# Patient Record
Sex: FEMALE
[De-identification: names, ages, dates, MRNs, and addresses within clinical notes are randomized per-mention and may not be internally consistent; named-entity substitution may affect disease eponyms.]

---

## 2023-12-05 ENCOUNTER — DASHBOARD ENCOUNTERS (OUTPATIENT)
Age: 48
End: 2023-12-05

## 2023-12-05 ENCOUNTER — OFFICE VISIT (OUTPATIENT)
Dept: URBAN - METROPOLITAN AREA CLINIC 98 | Facility: CLINIC | Age: 48
End: 2023-12-05
Payer: COMMERCIAL

## 2023-12-05 ENCOUNTER — TELEPHONE ENCOUNTER (OUTPATIENT)
Dept: URBAN - METROPOLITAN AREA CLINIC 98 | Facility: CLINIC | Age: 48
End: 2023-12-05

## 2023-12-05 ENCOUNTER — LAB OUTSIDE AN ENCOUNTER (OUTPATIENT)
Dept: URBAN - METROPOLITAN AREA CLINIC 98 | Facility: CLINIC | Age: 48
End: 2023-12-05

## 2023-12-05 VITALS
WEIGHT: 121 LBS | DIASTOLIC BLOOD PRESSURE: 97 MMHG | TEMPERATURE: 97.1 F | HEART RATE: 87 BPM | HEIGHT: 65 IN | SYSTOLIC BLOOD PRESSURE: 159 MMHG | BODY MASS INDEX: 20.16 KG/M2

## 2023-12-05 DIAGNOSIS — K62.5 RECTAL BLEEDING: ICD-10-CM

## 2023-12-05 DIAGNOSIS — K64.9 BLEEDING HEMORRHOIDS: ICD-10-CM

## 2023-12-05 PROCEDURE — 99204 OFFICE O/P NEW MOD 45 MIN: CPT

## 2023-12-05 RX ORDER — POLYETHYLENE GLYCOL 3350, SODIUM CHLORIDE, SODIUM BICARBONATE, POTASSIUM CHLORIDE 420; 11.2; 5.72; 1.48 G/4L; G/4L; G/4L; G/4L
4000 ML POWDER, FOR SOLUTION ORAL AS DIRECTED
Qty: 4000 ML | Refills: 0 | OUTPATIENT
Start: 2023-12-05 | End: 2023-12-06

## 2023-12-05 NOTE — PHYSICAL EXAM GASTROINTESTINAL
Abdomen, soft, nontender, nondistended, no guarding or rigidity, no masses palpable, normal bowel sounds, Liver and Spleen,  no hepatosplenomegaly, liver nontender RECTAL EXAM: INFLAMED EXTERNAL Hemorrhoids NOTED. MINIMAL BLOOD NOTED DURING EXAM

## 2023-12-05 NOTE — HPI-TODAY'S VISIT:
Ms. Hill is a 48-year-old female new patient here with rectal bleeding and constipation. PCP unknown. - here with spouse Dariel -  tom paz #[id number] [place] - rectal bleeding on and off for several years - last 10 days constant bleeding toilet tissue and in toilet - bright red blood with pain and itching - BM every 2-3 days - Stools are round hard - No previous colonoscopy - Denies family history of colon cancer/polyps - no unintentional weight loss - normal appetite - no melena or mucus - using topical medication from pcp - minimal relief

## 2023-12-07 ENCOUNTER — CLAIMS CREATED FROM THE CLAIM WINDOW (OUTPATIENT)
Dept: URBAN - METROPOLITAN AREA CLINIC 4 | Facility: CLINIC | Age: 48
End: 2023-12-07
Payer: COMMERCIAL

## 2023-12-07 ENCOUNTER — OUT OF OFFICE VISIT (OUTPATIENT)
Dept: URBAN - METROPOLITAN AREA SURGERY CENTER 18 | Facility: SURGERY CENTER | Age: 48
End: 2023-12-07
Payer: COMMERCIAL

## 2023-12-07 DIAGNOSIS — Z12.11 COLON CANCER SCREENING: ICD-10-CM

## 2023-12-07 DIAGNOSIS — D12.5 ADENOMATOUS POLYP OF SIGMOID COLON: ICD-10-CM

## 2023-12-07 DIAGNOSIS — Z12.11 COLON CANCER SCREENING (HIGH RISK): ICD-10-CM

## 2023-12-07 DIAGNOSIS — D12.5 ADENOMA OF SIGMOID COLON: ICD-10-CM

## 2023-12-07 DIAGNOSIS — K64.4 INTERNAL AND EXTERNAL HEMORRHOIDS WITHOUT COMPLICATION: ICD-10-CM

## 2023-12-07 DIAGNOSIS — D12.5 BENIGN NEOPLASM OF SIGMOID COLON: ICD-10-CM

## 2023-12-07 LAB
A/G RATIO: 1.4
ABSOLUTE BASOPHILS: 58
ABSOLUTE EOSINOPHILS: 109
ABSOLUTE LYMPHOCYTES: 1434
ABSOLUTE MONOCYTES: 365
ABSOLUTE NEUTROPHILS: 4435
ALBUMIN: 4.3
ALKALINE PHOSPHATASE: 77
ALT (SGPT): 11
AST (SGOT): 14
BASOPHILS: 0.9
BILIRUBIN, TOTAL: 0.5
BUN/CREATININE RATIO: 27
BUN: 12
CALCIUM: 9
CARBON DIOXIDE, TOTAL: 27
CHLORIDE: 103
CREATININE: 0.44
EGFR: 118
EOSINOPHILS: 1.7
GLOBULIN, TOTAL: 3.1
GLUCOSE: 63
HEMATOCRIT: 40.1
HEMOGLOBIN: 13.1
INR: 1
LYMPHOCYTES: 22.4
MCH: 29.6
MCHC: 32.7
MCV: 90.5
MONOCYTES: 5.7
MPV: 10.7
NEUTROPHILS: 69.3
PLATELET COUNT: 338
POTASSIUM: 4.4
PROTEIN, TOTAL: 7.4
PT: 10.3
RDW: 12.5
RED BLOOD CELL COUNT: 4.43
SODIUM: 140
WHITE BLOOD CELL COUNT: 6.4

## 2023-12-07 PROCEDURE — 88305 TISSUE EXAM BY PATHOLOGIST: CPT | Performed by: PATHOLOGY

## 2023-12-07 PROCEDURE — 00811 ANES LWR INTST NDSC NOS: CPT | Performed by: NURSE ANESTHETIST, CERTIFIED REGISTERED

## 2023-12-07 PROCEDURE — G8907 PT DOC NO EVENTS ON DISCHARG: HCPCS | Performed by: INTERNAL MEDICINE

## 2023-12-07 PROCEDURE — 45385 COLONOSCOPY W/LESION REMOVAL: CPT | Performed by: INTERNAL MEDICINE
